# Patient Record
Sex: FEMALE | Race: WHITE | NOT HISPANIC OR LATINO | URBAN - METROPOLITAN AREA
[De-identification: names, ages, dates, MRNs, and addresses within clinical notes are randomized per-mention and may not be internally consistent; named-entity substitution may affect disease eponyms.]

---

## 2017-06-22 ENCOUNTER — AMBULATORY - HEALTHEAST (OUTPATIENT)
Dept: MULTI SPECIALTY CLINIC | Facility: CLINIC | Age: 52
End: 2017-06-22

## 2017-06-22 ENCOUNTER — RECORDS - HEALTHEAST (OUTPATIENT)
Dept: ADMINISTRATIVE | Facility: OTHER | Age: 52
End: 2017-06-22

## 2017-06-22 LAB
HPV_EXT - HISTORICAL: NORMAL
PAP SMEAR - HIM PATIENT REPORTED: NORMAL

## 2018-05-18 ENCOUNTER — OFFICE VISIT - HEALTHEAST (OUTPATIENT)
Dept: FAMILY MEDICINE | Facility: CLINIC | Age: 53
End: 2018-05-18

## 2018-05-18 ENCOUNTER — COMMUNICATION - HEALTHEAST (OUTPATIENT)
Dept: TELEHEALTH | Facility: CLINIC | Age: 53
End: 2018-05-18

## 2018-05-18 DIAGNOSIS — F40.298 NEEDLE PHOBIA: ICD-10-CM

## 2018-05-18 DIAGNOSIS — E03.9 ACQUIRED HYPOTHYROIDISM: ICD-10-CM

## 2018-05-18 DIAGNOSIS — Z12.31 VISIT FOR SCREENING MAMMOGRAM: ICD-10-CM

## 2018-05-18 DIAGNOSIS — Z12.11 SCREEN FOR COLON CANCER: ICD-10-CM

## 2018-05-18 RX ORDER — LEVONORGESTREL AND ETHINYL ESTRADIOL 0.15-0.03
KIT ORAL
Status: SHIPPED | COMMUNITY
Start: 2017-12-15

## 2018-05-18 RX ORDER — CETIRIZINE HYDROCHLORIDE 10 MG/1
10 TABLET ORAL
Status: SHIPPED | COMMUNITY
Start: 2018-05-18

## 2018-05-18 ASSESSMENT — MIFFLIN-ST. JEOR: SCORE: 1107.1

## 2018-05-23 ENCOUNTER — COMMUNICATION - HEALTHEAST (OUTPATIENT)
Dept: FAMILY MEDICINE | Facility: CLINIC | Age: 53
End: 2018-05-23

## 2018-05-29 ENCOUNTER — RECORDS - HEALTHEAST (OUTPATIENT)
Dept: ADMINISTRATIVE | Facility: OTHER | Age: 53
End: 2018-05-29

## 2018-06-11 ENCOUNTER — OFFICE VISIT - HEALTHEAST (OUTPATIENT)
Dept: FAMILY MEDICINE | Facility: CLINIC | Age: 53
End: 2018-06-11

## 2018-06-11 DIAGNOSIS — E03.9 ACQUIRED HYPOTHYROIDISM: ICD-10-CM

## 2018-06-11 DIAGNOSIS — Z00.00 WELL FEMALE EXAM WITHOUT GYNECOLOGICAL EXAM: ICD-10-CM

## 2018-06-11 DIAGNOSIS — Z13.220 SCREENING FOR LIPID DISORDERS: ICD-10-CM

## 2018-06-11 DIAGNOSIS — F40.298 NEEDLE PHOBIA: ICD-10-CM

## 2018-06-11 DIAGNOSIS — Z13.0 SCREENING, ANEMIA, DEFICIENCY, IRON: ICD-10-CM

## 2018-06-11 DIAGNOSIS — Z13.1 SCREENING FOR DIABETES MELLITUS: ICD-10-CM

## 2018-06-11 LAB
FASTING STATUS PATIENT QL REPORTED: NORMAL
GLUCOSE BLD-MCNC: 86 MG/DL (ref 74–125)
HGB BLD-MCNC: 13.9 G/DL (ref 12–16)

## 2018-06-11 ASSESSMENT — MIFFLIN-ST. JEOR: SCORE: 1326.64

## 2018-06-12 ENCOUNTER — HOSPITAL ENCOUNTER (OUTPATIENT)
Dept: MAMMOGRAPHY | Facility: CLINIC | Age: 53
Discharge: HOME | End: 2018-06-12
Attending: NURSE PRACTITIONER

## 2018-06-12 DIAGNOSIS — Z12.31 VISIT FOR SCREENING MAMMOGRAM: ICD-10-CM

## 2018-06-12 LAB
CHOLEST SERPL-MCNC: 299 MG/DL
FASTING STATUS PATIENT QL REPORTED: ABNORMAL
HDLC SERPL-MCNC: 37 MG/DL
LDLC SERPL CALC-MCNC: 189 MG/DL
TRIGL SERPL-MCNC: 364 MG/DL
TSH SERPL DL<=0.005 MIU/L-ACNC: 3.86 UIU/ML (ref 0.3–5)

## 2018-06-16 ENCOUNTER — COMMUNICATION - HEALTHEAST (OUTPATIENT)
Dept: FAMILY MEDICINE | Facility: CLINIC | Age: 53
End: 2018-06-16

## 2018-06-16 DIAGNOSIS — E03.9 ACQUIRED HYPOTHYROIDISM: ICD-10-CM

## 2018-06-18 RX ORDER — LEVOTHYROXINE SODIUM 112 UG/1
TABLET ORAL
Qty: 84 TABLET | Refills: 3 | Status: SHIPPED | OUTPATIENT
Start: 2018-06-18

## 2018-08-21 ENCOUNTER — COMMUNICATION - HEALTHEAST (OUTPATIENT)
Dept: FAMILY MEDICINE | Facility: CLINIC | Age: 53
End: 2018-08-21

## 2018-10-03 ENCOUNTER — COMMUNICATION - HEALTHEAST (OUTPATIENT)
Dept: FAMILY MEDICINE | Facility: CLINIC | Age: 53
End: 2018-10-03

## 2019-05-16 ENCOUNTER — COMMUNICATION - HEALTHEAST (OUTPATIENT)
Dept: FAMILY MEDICINE | Facility: CLINIC | Age: 54
End: 2019-05-16

## 2019-06-13 ENCOUNTER — COMMUNICATION - HEALTHEAST (OUTPATIENT)
Dept: FAMILY MEDICINE | Facility: CLINIC | Age: 54
End: 2019-06-13

## 2019-07-25 ENCOUNTER — COMMUNICATION - HEALTHEAST (OUTPATIENT)
Dept: FAMILY MEDICINE | Facility: CLINIC | Age: 54
End: 2019-07-25

## 2019-07-25 DIAGNOSIS — E03.9 ACQUIRED HYPOTHYROIDISM: ICD-10-CM

## 2021-05-29 NOTE — TELEPHONE ENCOUNTER
I'm not comfortable continuing this medication without being seen. I had not originally prescribed this medication. I do recommend that she follow up with a new provider in [place].

## 2021-05-29 NOTE — TELEPHONE ENCOUNTER
Medication Request  Medication name: Vocation  Pharmacy Name and Location: [facility]  Reason for request: Patient stated she takes this birth control continuously and it's a 28 day pack. Patient stated Pilar Hauser CNP was the one to prescribe it last. Patient stated she has 9 refills left but in order for her to take this Rx continuously, she needs an okay from the doctor. Patient stated will be seeing a new PCP in [place] but she would like for Pilar Hauser CNP to call this in. Patient was informed there is no record of this Rx and the current Rx is historical. Please advise on this.  When did you use medication last?:  n/a  Patient offered appointment:  patient declined and patient is currently living in [place]  Okay to leave a detailed message: yes  [phone]

## 2021-05-29 NOTE — TELEPHONE ENCOUNTER
Left message to call back for:Kami  Information to relay to patient:  Relayed message on identified line

## 2021-05-30 NOTE — TELEPHONE ENCOUNTER
I believe patient has moved to [place]. Has she established with a new PCP? I can give her months worth but is is over due for medication check/ lab work. Pilar Hauser, CNP

## 2021-05-30 NOTE — TELEPHONE ENCOUNTER
LM for pt to call clinic back. Please see below for information to relay to pt.:    I believe patient has moved to [place]. Has she established with a new PCP? I can give her months worth but is is over due for medication check/ lab work. Pilar Hauser, CNP

## 2021-05-30 NOTE — TELEPHONE ENCOUNTER
FYI - Status Update  Who is Calling: Patient  Update: Patient returns call, states has been seen with new PCP in [place], and this medication has been filled by new PCP, pharmacy had auto called for refill to MEME Hauser NP in error, Patient appreciates the offer to fill for one month, but not needed.  Okay to leave a detailed message?:  No return call needed

## 2021-05-30 NOTE — TELEPHONE ENCOUNTER
RN cannot approve Refill Request    RN can NOT refill this medication PCP messaged that patient is overdue for Labs and Office Visit. Last office visit: 5/18/2018 Pilar Hauser CNP Last Physical: 6/11/2018 Last MTM visit: Visit date not found Last visit same specialty: 5/18/2018 Pilar Hauser CNP.  Next visit within 3 mo: Visit date not found  Next physical within 3 mo: Visit date not found      Josephine GUTIERREZ Lizabeth, [facility] Triage/Med Refill 7/25/2019    Requested Prescriptions   Pending Prescriptions Disp Refills     levothyroxine (SYNTHROID, LEVOTHROID) 112 MCG tablet [Pharmacy Med Name: LEVOTHYROXIN 112MCG TAB] 20 tablet 16     Sig: TAKE 1 TABLET BY MOUTH ONCE DAILY 5  TIMES  A  WEEK       Thyroid Hormones Protocol Failed - 7/25/2019  9:08 AM        Failed - Provider visit in past 12 months or next 3 months     Last office visit with prescriber/PCP: 5/18/2018 Pilar Hauser CNP OR same dept: Visit date not found OR same specialty: 5/18/2018 Pilar Hauser CNP  Last physical: 6/11/2018 Last MTM visit: Visit date not found   Next visit within 3 mo: Visit date not found  Next physical within 3 mo: Visit date not found  Prescriber OR PCP: Pilar Hauser CNP  Last diagnosis associated with med order: 1. Acquired hypothyroidism  - levothyroxine (SYNTHROID, LEVOTHROID) 112 MCG tablet [Pharmacy Med Name: LEVOTHYROXIN 112MCG TAB]; TAKE 1 TABLET BY MOUTH ONCE DAILY 5  TIMES  A  WEEK  Dispense: 20 tablet; Refill: 16    If protocol passes may refill for 12 months if within 3 months of last provider visit (or a total of 15 months).             Failed - TSH on file in past 12 months for patient age 12 & older     TSH   Date Value Ref Range Status   06/11/2018 3.86 0.30 - 5.00 uIU/mL Final

## 2021-05-30 NOTE — TELEPHONE ENCOUNTER
2nd attempt to reach patient    Left message to call back for: Kami    Information to relay to patient:  LMTCB    See message below Pilar Hauser CNP    I believe patient has moved to [place]. Has she established with a new PCP? I can give her months worth but is is over due for medication check/ lab work. VARHSA Taveras, [facility]

## 2021-06-01 VITALS — WEIGHT: 121 LBS | HEIGHT: 62 IN | BODY MASS INDEX: 22.26 KG/M2

## 2021-06-01 VITALS — WEIGHT: 169.4 LBS | BODY MASS INDEX: 31.17 KG/M2 | HEIGHT: 62 IN

## 2021-06-18 NOTE — PROGRESS NOTES
FEMALE PREVENTATIVE EXAM    Assessment and Plan:     1. Well female exam without gynecological exam  Healthy female exam completed today. PAP smear was completed last year at [facility] it was normal and negative. She will be due for a pap 2022. She will be screen for cholesterol, diabetes, and anemia. I will also check her thyroid today as she has a history of hypothyroidism. She does her best living a healthy lifestyle by eating a balanced diet and exercising. She will be having her mammogram tomorrow. I recommend yearly follow up for medication check and annual physical. She is in agreement with this plan.     2. Screening for lipid disorders  - Lipid Cascade    3. Screening for diabetes mellitus  - Glucose    4. Screening, anemia, deficiency, iron  - Hemoglobin    5. Acquired hypothyroidism  Doing well on current dose. Will send in refill after result is completed.   - Thyroid Stimulating Hormone (TSH)    6. Needle phobia  History of needle phobia from as a child.      Next follow up:  Return in about 1 year (around 6/11/2019), or or sooner as needed. , for Annual physical, Medication Check.    Immunization Review  Adult Imm Review: Stated as current, no records available      I discussed the following with the patient:   Adult Healthy Living: Importance of regular exercise  Healthy nutrition  Stress management      Subjective:   Chief Complaint: Kami Mejia is an 52 y.o. female here for a preventative health visit.      HPI: Overall doing well. She has her mammogram scheduled for tomorrow. She needs her TSH checked to continue to receive her levothyroxine. Her vitals are stable she has no other significant past medical history. She is on an oral birth control pill due to heavy mensis in her perimenopause years. She was placed on this by an Ob/GYN.      Healthy Habits  Are you taking a daily aspirin? No  Do you typically exercising at least 40 min, 3-4 times per week?  Yes  Do you  "usually eat at least 4 servings of fruit and vegetables a day, include whole grains and fiber and avoid regularly eating high fat foods? Yes, does not avoid high fat foods.   Have you had an eye exam in the past two years? Yes  Do you see a dentist twice per year? Yes  Do you have any concerns regarding sleep? No    Safety Screen  If you own firearms, are they secured in a locked gun cabinet or with trigger locks? Yes  Do you feel you are safe where you are living?: Yes (6/11/2018  4:54 PM)  Do you feel you are safe in your relationship(s)?: Yes (6/11/2018  4:54 PM)    Review of Systems:  Please see above.  The rest of the review of systems are negative for all systems.     Pap History:   PAP normal HPV negative.  last PAP 6/22/17    Patient Care Team:  Pilar Hauser CNP as PCP - General (Family Medicine)        History     Reviewed By Date/Time Sections Reviewed    Pilar Hauser CNP 6/11/2018  5:25 PM Medical, Surgical, Tobacco, Alcohol, Drug Use, Sexual Activity, Family    Janell Parker [facility] 6/11/2018  4:57 PM Tobacco    Janell Parker [facility] 6/11/2018  4:54 PM Tobacco            Objective:   Vital Signs:   Visit Vitals     /74 (Patient Site: Left Arm, Patient Position: Sitting, Cuff Size: Adult Regular)     Pulse (!) 56     Resp 16     Ht 5' 2\" (1.575 m)     Wt 169 lb 6.4 oz (76.8 kg)     BMI 30.98 kg/m2          PHYSICAL EXAM  /74 (Patient Site: Left Arm, Patient Position: Sitting, Cuff Size: Adult Regular)  Pulse (!) 56  Resp 16  Ht 5' 2\" (1.575 m)  Wt 169 lb 6.4 oz (76.8 kg)  BMI 30.98 kg/m2    General Appearance:    Alert, cooperative, no distress, appears stated age   Head:    Normocephalic, without obvious abnormality, atraumatic   Eyes:    PERRL, conjunctiva/corneas clear, EOM's intact, fundi     benign, both eyes   Ears:    Normal TM's and external ear canals, both ears   Nose:   Nares normal, septum midline, mucosa normal, no drainage     or sinus tenderness   Throat:   " Lips, mucosa, and tongue normal; teeth and gums normal   Neck:   Supple, symmetrical, trachea midline, no adenopathy;     thyroid:  no enlargement/tenderness/nodules; no carotid    bruit or JVD   Back:     Symmetric, no curvature, ROM normal, no CVA tenderness   Lungs:     Clear to auscultation bilaterally, respirations unlabored   Chest Wall:    No tenderness or deformity    Heart:    Regular rate and rhythm, S1 and S2 normal, no murmur, rub    or gallop   Breast Exam:    No tenderness, masses, or nipple abnormality   Abdomen:     Soft, non-tender, bowel sounds active all four quadrants,     no masses, no organomegaly   Genitalia:    Deferred, no concerns.    Rectal:    Deferred.    Extremities:   Extremities normal, atraumatic, no cyanosis or edema   Pulses:   2+ and symmetric all extremities   Skin:   Skin color, texture, turgor normal, no rashes or lesions   Lymph nodes:   Cervical, supraclavicular, and axillary nodes normal   Neurologic:   CNII-XII intact, normal strength, sensation and reflexes     throughout         The ASCVD Risk score (Marblemount DC Jr, et al., 2013) failed to calculate for the following reasons:    Cannot find a previous HDL lab    Cannot find a previous total cholesterol lab         Medication List          These changes are accurate as of 6/11/18 11:59 PM.  If you have any questions, ask your nurse or doctor.               CONTINUE taking these medications          cetirizine 10 MG tablet   Also known as:  ZyrTEC   INSTRUCTIONS:  Take 10 mg by mouth.           cholecalciferol (vitamin D3) 1,000 unit capsule   INSTRUCTIONS:  Take 1,000 mg by mouth.           levonorgestrel-ethinyl estradiol 0.15-0.03 mg per tablet   Also known as:  NORDETTE   INSTRUCTIONS:  Take by mouth.           levothyroxine 112 MCG tablet   Also known as:  SYNTHROID, LEVOTHROID   INSTRUCTIONS:  One tab 5 days weekly                 Additional Screenings Completed Today:       Results for orders placed or performed in visit  on 06/11/18   Glucose   Result Value Ref Range    Glucose 86 74 - 125 mg/dL    Patient Fasting > 8hrs? Unknown    Hemoglobin   Result Value Ref Range    Hemoglobin 13.9 12.0 - 16.0 g/dL

## 2021-06-18 NOTE — PROGRESS NOTES
Assessment/Plan:       1. Acquired hypothyroidism  History of hypothyroidism.  Declining having her thyroid checked today.  I did inform patient that she will need to have it checked in the next 90 days otherwise no further refills will be given.  Recommend that she follow-up with an annual physical in the next month or so and blood work can be completed at that time along with preventative measure blood work.  She is in agreement with this plan.  She reports that she feels well on the 112 mcg 5 days per week.  - levothyroxine (SYNTHROID, LEVOTHROID) 112 MCG tablet; One tab 5 days weekly  Dispense: 90 tablet; Refill: 0    2. Visit for screening mammogram  Screening mammography placed today.  - Mammo Screening Bilateral; Future    3. Screen for colon cancer  History of having colon cancer screening completed.  Requested records for this.    4. Needle phobia  Patient reports severe needle phobia.  Declining blood work completed today she will return to the clinic for an annual physical and have blood work completed during that visit.        Subjective:      Kami Mejia is a 52 y.o. female who presents for medication management. She would like to have her thyroid medication renewed. She has been stable on 112 mcq 5 days per week. She does not take it on the weekend. Her TSH level is normally around 2.  Her last thyroid check was in June 2017.  She does mention that she has a severe needle phobia and would prefer not to have her thyroid checked today but will return to the clinic for an annual physical to have all blood work completed at that time. She continues on OCPs without a break and will have break through bleeding intermittently. She has been seen by [facility] for the break through bleeding.  And she is being monitored by them regarding this. She does not require refill of this today. She has a history of irregular mensis which became regular after her 2nd child. She then developed heavy mensis in the  perimenopausal years.  She would also like to have a mammogram completed.  She reports that she has a history of dense breast tissue with cyst aspiration and removal in the past.  [facility] had recommended every 6 month mammograms.  Her last mammogram was in 2017 and this was completed through [facility] and 3D imaging was completed at that time.  She had conflicting information from the [facility] breast center recommending yearly evaluations of her breast tissue.  She would like to do a mammogram now to see with the recommendations will be from [facility].  She reports that she will feel more comfortable with a one-year recommendation if it does come from 2 separate entities.  She has a history of hyperlipidemia that is likely familial.  She has not been on a statin.  He is due for an annual physical in 2018.  She works as a psychiatrist.     The following portions of the patient's history were reviewed and updated as appropriate: allergies, current medications, past family history, past medical history, past social history, past surgical history and problem list.    Past Medical History:   Diagnosis Date     Disease of thyroid gland      Past Surgical History:   Procedure Laterality Date      SECTION, CLASSIC       TONSILLECTOMY       Current Outpatient Prescriptions   Medication Sig Note     levonorgestrel-ethinyl estradiol (NORDETTE) 0.15-0.03 mg per tablet Take by mouth. 2018: Received from: [facility] Received Sig: Take 1 Tab by mouth daily.     levothyroxine (SYNTHROID, LEVOTHROID) 112 MCG tablet One tab 5 days weekly      cetirizine (ZYRTEC) 10 MG tablet Take 10 mg by mouth. 2018: Received from: [facility] Received Sig: Take 10 mg by mouth daily. Reported on 2016     cholecalciferol, vitamin D3, 1,000 unit capsule Take 1,000 mg by mouth. 2018: Received from: [facility] Received Sig: Take 1,000 mg by mouth daily. Indications: TAKE 1  "CAPSULE BY MOUTH DAILY     Social History     Social History     Marital status: Single     Spouse name: N/A     Number of children: N/A     Years of education: N/A     Occupational History     Not on file.     Social History Main Topics     Smoking status: Never Smoker     Smokeless tobacco: Never Used     Alcohol use 0.6 oz/week     1 Shots of liquor per week      Comment: rare     Drug use: No     Sexual activity: Yes     Partners: Male     Other Topics Concern     Not on file     Social History Narrative     No narrative on file     Family History   Problem Relation Age of Onset     Hypertension Mother      Hyperlipidemia Mother      Hypertension Father      Hyperlipidemia Father      Breast cancer Neg Hx      Colon cancer Neg Hx      Stroke Neg Hx        Review of Systems   Pertinent items are noted in HPI.      Objective:      /80  Pulse 60  Resp 16  Ht 5' 2\" (1.575 m)  Wt 121 lb (54.9 kg)  BMI 22.13 kg/m2    General appearance: alert, appears stated age and cooperative  Head: Normocephalic, without obvious abnormality, atraumatic  Neck: no adenopathy, no carotid bruit, no JVD, supple, symmetrical, trachea midline and thyroid not enlarged, symmetric, no tenderness/mass/nodules  Pulses: 2+ and symmetric  Skin: Skin color, texture, turgor normal. No rashes or lesions  Neurologic: Grossly normal       "

## 2021-06-19 NOTE — LETTER
Letter by Pilar Hauser CNP at      Author: Pilar Hauser CNP Service: -- Author Type: --    Filed:  Encounter Date: 5/16/2019 Status: (Other)         Kami Mejia  [address] [facility] [address]    May 16, 2019      Dear Kami Mejia    In reviewing your records, we have determined a gap in your preventive services. Based on your age and health history, we recommend the follow service.     ? General Physical  ? Physical with a Pap Smear  ? Colon cancer screening  ? Mammogram  ? Immunization  ? Diabetic check  ? Blood pressure/cardiovascular check  ? Asthma check  ? Cholesterol test  ? Lab work  ? Med check      If you have had the service elsewhere, please contact us so we can update our records. Please let us know if you have transferred your care to another clinic.    Please call [phone] to schedule this appointment.    We believe that a strong preventive care program, including regular physicals and follow-up care is an important part of a healthy lifestyle and we are committed to helping you maintain your health.    Thank you for choosing us as your health care provider.    Sincerely,     [facility]